# Patient Record
Sex: FEMALE | Race: WHITE | NOT HISPANIC OR LATINO | Employment: FULL TIME | URBAN - METROPOLITAN AREA
[De-identification: names, ages, dates, MRNs, and addresses within clinical notes are randomized per-mention and may not be internally consistent; named-entity substitution may affect disease eponyms.]

---

## 2017-01-12 DIAGNOSIS — Z82.49 FAMILY HISTORY OF ANEURYSM: Primary | ICD-10-CM

## 2017-01-12 DIAGNOSIS — I77.71 CAROTID ARTERY DISSECTION (H): ICD-10-CM

## 2017-01-13 ENCOUNTER — TELEPHONE (OUTPATIENT)
Dept: NEUROLOGY | Facility: CLINIC | Age: 57
End: 2017-01-13

## 2017-01-13 DIAGNOSIS — I77.71 CAROTID ARTERY DISSECTION (H): Primary | ICD-10-CM

## 2017-02-27 ENCOUNTER — TELEPHONE (OUTPATIENT)
Dept: NEUROLOGY | Facility: CLINIC | Age: 57
End: 2017-02-27

## 2017-04-04 ENCOUNTER — TELEPHONE (OUTPATIENT)
Dept: NEUROLOGY | Facility: CLINIC | Age: 57
End: 2017-04-04

## 2017-04-04 NOTE — TELEPHONE ENCOUNTER
Received VM from Martha at [facility] in [place]. She had questions about the orders. Will forward to Kathleen Germain RN in .

## 2017-04-05 NOTE — TELEPHONE ENCOUNTER
I returned call to Martha at [facility] . She was not at clinic. Co worker(Cayden) states that pt had scan yesterday.  Cayden will give message to Martha and they will call back if any question still. I left  phone number  Darla Severin-Brown, ZACKN

## 2017-05-23 ENCOUNTER — OFFICE VISIT (OUTPATIENT)
Dept: NEUROSURGERY | Facility: CLINIC | Age: 57
End: 2017-05-23

## 2017-05-23 VITALS — HEART RATE: 71 BPM | SYSTOLIC BLOOD PRESSURE: 135 MMHG | DIASTOLIC BLOOD PRESSURE: 72 MMHG | HEIGHT: 64 IN

## 2017-05-23 DIAGNOSIS — I77.71 INTERNAL CAROTID ARTERY DISSECTION (H): Primary | ICD-10-CM

## 2017-05-23 ASSESSMENT — ENCOUNTER SYMPTOMS
HALLUCINATIONS: 0
FEVER: 0
POLYPHAGIA: 0
NIGHT SWEATS: 0
POLYDIPSIA: 0
WEIGHT LOSS: 0
CHILLS: 0
WEIGHT GAIN: 0
FATIGUE: 1
ALTERED TEMPERATURE REGULATION: 1
DECREASED APPETITE: 0
INCREASED ENERGY: 1

## 2017-05-23 ASSESSMENT — PAIN SCALES - GENERAL: PAINLEVEL: NO PAIN (0)

## 2017-05-23 NOTE — LETTER
5/23/2017      RE: Kimber Dumont  [address] [facility] [address]       Dear Dr. De Oliveira:    We saw Ms. Kimber Dumont in [facility]surgery [facility] today. She is a 56 year old right-handed woman with family history significant for intracranial aneurysms and essential tremor. An MRA was done at [facility] in [place] to evaluate for aneurysms. No aneurysms were found; however it did reveal a possible carotid dissection. Today, she is doing well.  She was on aspirin and Plavix for 6 months after the diagnosis, and now is on aspirin 81 mg daily now. This dissection was discovered about 2 years ago. She denies any strokes or TIAs. She did have bronchitis 3-4 years ago, but denies vigorous coughing. She sees a chiropractor once a month, but the last time she had a neck manipulation was 2 years ago.   FAMILY HISTORY: Mother had two intracranial aneurysms. Cousin, maternal uncle, and paternal aunt had intracranial aneurysms. No known history of carotid disease.   MEDICAL HISTORY: History of essential tremor. Her voice began changing at 47. She has began noticing mild hand tremors and internal tremors. She has received botox injections for her vocal chords. Taking primidone. Bilateral carpal tunnel and trigger finger release surgeries.   SOCiAL HISTORY: She lives alone in [place], [place] near [place]. 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 She has two children, 24 and 21. Quit smoking 20 years ago.   PHYSICAL EXAM: On exam, she appears well. The most obvious feature is that she has laryngeal tremor and her phonation fluctuates.  Extraocular movements intact. She moves upper and lower extremities equally and with good coordination. Fluent and coherent speech. Pupils are equal and reactive. No ptosis. Gross neurological examination is normal. NIH stroke scale score is 0.  REVIEW OF SYSTEMS: The axial source images show the dissection in the distal cervical segment of the right internal carotid artery and extends to the  craniocervical junction. We can see some mild diffuse dilatation of the ICA on the reconstructions, but here is no obvious pseudoaneurysm. There does not appear to be any flow limitation or attenuation of the artery distal to this segment  IMPRESSION AND PLAN: We agree completely with the medical management of this asymptomatic carotid dissection and continuing aspirin indefinitely. We will arrange for a follow-up MRA in [place] in 1 year and will contact her with the results. It is possible that this was a spontaneous dissection. It is also possible that chiropractor manipulation or severe coughing from bronchitis triggered this, but there is no way to prove these etiologies. She is no longer getting chiropractic manipulation of the neck, and we agree with this strategy.   I appreciate your confidence in involving us in her care. Please do not hesitate to contact us with questions. We will keep you informed of her progress.     I, Holly Kelly, am serving as a scribe to document services personally performed by Lucy Correia MD, based upon my observations and the provider's statements to me. All documentation has been reviewed by the aforementioned doctor prior to being entered into the official medical record.    I, Lucy Correia, attest that above named individual is acting in scribe capacity, has observed my performance of the services and has documented them in accordance with my direction. The documentation recorded by the scribe accurately reflects the service I personally performed and the decisions made by me.      Lucy Correia MD

## 2017-05-23 NOTE — NURSING NOTE
Chief Complaint   Patient presents with     Consult     Carotid dissection/ Family hx of aneurysms/ EPIC/RECORDS REC'D/PACS. -KB    Armaan Brand CMA

## 2017-05-23 NOTE — PROGRESS NOTES
Dear Dr. De Oliveira:    We saw Ms. Kimber Dumont in [facility]bro[facility] Neurosurgery Clinic today. She is a 56 year old right-handed woman with family history significant for intracranial aneurysms and essential tremor. An MRA was done at [facility] in [place] to evaluate for aneurysms. No aneurysms were found; however it did reveal a possible carotid dissection. Today, she is doing well.  She was on aspirin and Plavix for 6 months after the diagnosis, and now is on aspirin 81 mg daily now. This dissection was discovered about 2 years ago. She denies any strokes or TIAs. She did have bronchitis 3-4 years ago, but denies vigorous coughing. She sees a chiropractor once a month, but the last time she had a neck manipulation was 2 years ago.   FAMILY HISTORY: Mother had two intracranial aneurysms. Cousin, maternal uncle, and paternal aunt had intracranial aneurysms. No known history of carotid disease.   MEDICAL HISTORY: History of essential tremor. Her voice began changing at 47. She has began noticing mild hand tremors and internal tremors. She has received botox injections for her vocal chords. Taking primidone. Bilateral carpal tunnel and trigger finger release surgeries.   SOCiAL HISTORY: She lives alone in [place], [place] near [place]. 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 She has two children, 24 and 21. Quit smoking 20 years ago.   PHYSICAL EXAM: On exam, she appears well. The most obvious feature is that she has laryngeal tremor and her phonation fluctuates.  Extraocular movements intact. She moves upper and lower extremities equally and with good coordination. Fluent and coherent speech. Pupils are equal and reactive. No ptosis. Gross neurological examination is normal. NIH stroke scale score is 0.  REVIEW OF SYSTEMS: The axial source images show the dissection in the distal cervical segment of the right internal carotid artery and extends to the craniocervical junction. We can see some mild diffuse dilatation of the ICA on  the reconstructions, but here is no obvious pseudoaneurysm. There does not appear to be any flow limitation or attenuation of the artery distal to this segment  IMPRESSION AND PLAN: We agree completely with the medical management of this asymptomatic carotid dissection and continuing aspirin indefinitely. We will arrange for a follow-up MRA in [place] in 1 year and will contact her with the results. It is possible that this was a spontaneous dissection. It is also possible that chiropractor manipulation or severe coughing from bronchitis triggered this, but there is no way to prove these etiologies. She is no longer getting chiropractic manipulation of the neck, and we agree with this strategy.   I appreciate your confidence in involving us in her care. Please do not hesitate to contact us with questions. We will keep you informed of her progress.     ROBERT ANDREWS MD    I, Holly Kelly, am serving as a scribe to document services personally performed by Robert Andrews MD, based upon my observations and the provider's statements to me. All documentation has been reviewed by the aforementioned doctor prior to being entered into the official medical record.    I, Robert Andrews, attest that above named individual is acting in scribe capacity, has observed my performance of the services and has documented them in accordance with my direction. The documentation recorded by the scribe accurately reflects the service I personally performed and the decisions made by me.

## 2017-05-23 NOTE — LETTER
5/23/2017       RE: Kimber Dumont  [address] [facility] [address]     Dear Colleague,    Thank you for referring your patient, Kimber Dumont, to the [facility] NEUROSURGERY at [facility]. Please see a copy of my visit note below.    Dear Dr. De Oliveira:    We saw Ms. Kimber Dumont in [facility]brovascular Neurosurgery Clinic today. She is a 56 year old right-handed woman with family history significant for intracranial aneurysms and essential tremor. An MRA was done at [facility] in [place] to evaluate for aneurysms. No aneurysms were found; however it did reveal a possible carotid dissection. Today, she is doing well.  She was on aspirin and Plavix for 6 months after the diagnosis, and now is on aspirin 81 mg daily now. This dissection was discovered about 2 years ago. She denies any strokes or TIAs. She did have bronchitis 3-4 years ago, but denies vigorous coughing. She sees a chiropractor once a month, but the last time she had a neck manipulation was 2 years ago.   FAMILY HISTORY: Mother had two intracranial aneurysms. Cousin, maternal uncle, and paternal aunt had intracranial aneurysms. No known history of carotid disease.   MEDICAL HISTORY: History of essential tremor. Her voice began changing at 47. She has began noticing mild hand tremors and internal tremors. She has received botox injections for her vocal chords. Taking primidone. Bilateral carpal tunnel and trigger finger release surgeries.   SOCiAL HISTORY: She lives alone in [place], [place] near [place]. 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 She has two children, 24 and 21. Quit smoking 20 years ago.   PHYSICAL EXAM: On exam, she appears well. The most obvious feature is that she has laryngeal tremor and her phonation fluctuates.  Extraocular movements intact. She moves upper and lower extremities equally and with good coordination. Fluent and coherent speech. Pupils are equal and reactive. No ptosis. Gross neurological  examination is normal. NIH stroke scale score is 0.  REVIEW OF SYSTEMS: The axial source images show the dissection in the distal cervical segment of the right internal carotid artery and extends to the craniocervical junction. We can see some mild diffuse dilatation of the ICA on the reconstructions, but here is no obvious pseudoaneurysm. There does not appear to be any flow limitation or attenuation of the artery distal to this segment  IMPRESSION AND PLAN: We agree completely with the medical management of this asymptomatic carotid dissection and continuing aspirin indefinitely. We will arrange for a follow-up MRA in [place] in 1 year and will contact her with the results. It is possible that this was a spontaneous dissection. It is also possible that chiropractor manipulation or severe coughing from bronchitis triggered this, but there is no way to prove these etiologies. She is no longer getting chiropractic manipulation of the neck, and we agree with this strategy.   I appreciate your confidence in involving us in her care. Please do not hesitate to contact us with questions. We will keep you informed of her progress.     ROBERT ANDREWS MD    I, Holly Kelly, am serving as a scribe to document services personally performed by Robert Andrews MD, based upon my observations and the provider's statements to me. All documentation has been reviewed by the aforementioned doctor prior to being entered into the official medical record.    I, Robert Andrews, attest that above named individual is acting in scribe capacity, has observed my performance of the services and has documented them in accordance with my direction. The documentation recorded by the scribe accurately reflects the service I personally performed and the decisions made by me.

## 2017-05-23 NOTE — MR AVS SNAPSHOT
"              After Visit Summary   5/23/2017    Kimber Dumont    MRN: [id number]           Patient Information     Date Of Birth          1960        Visit Information        Provider Department      5/23/2017 3:30 PM Lucy Correia MD [facility] Neurosurgery        Today's Diagnoses     Internal carotid artery dissection (H)    -  1       Follow-ups after your visit        Who to contact     Please call your clinic at [phone] to:    Ask questions about your health    Make or cancel appointments    Discuss your medicines    Learn about your test results    Speak to your doctor   If you have compliments or concerns about an experience at your clinic, or if you wish to file a complaint, please contact [facility] Patient Relations at [phone] or email us at [email or web address]         Additional Information About Your Visit        MyChart Information     [employer] gives you secure access to your electronic health record. If you see a primary care provider, you can also send messages to your care team and make appointments. If you have questions, please call your primary care clinic.  If you do not have a primary care provider, please call [phone] and they will assist you.      [employer] is an electronic gateway that provides easy, online access to your medical records. With [employer], you can request a clinic appointment, read your test results, renew a prescription or communicate with your care team.     To access your existing account, please contact your [facility] Clinic or call [phone] for assistance.        Care EveryWhere ID     This is your Care EveryWhere ID. This could be used by other organizations to access your [facility] medical records  [id number]        Your Vitals Were     Pulse Height                71 1.626 m (5' 4\")           Blood Pressure from Last 3 Encounters:   05/23/17 135/72   12/19/16 140/80    " Weight from Last 3 Encounters:   12/19/16 91.2 kg (201 lb)              Today, you had the following     No orders found for display       Primary Care Provider Office Phone # Fax #    Latha Parra [phone]       [facility] [address] [facility] [address]        Thank you!     Thank you for choosing [facility]  for your care. Our goal is always to provide you with excellent care. Hearing back from our patients is one way we can continue to improve our services. Please take a few minutes to complete the written survey that you may receive in the mail after your visit with us. Thank you!             Your Updated Medication List - Protect others around you: Learn how to safely use, store and throw away your medicines at www.disposemymeds.org.          This list is accurate as of: 5/23/17 11:59 PM.  Always use your most recent med list.                   Brand Name Dispense Instructions for use    aspirin 81 MG tablet      Take 81 mg by mouth daily       primidone 12.5 mg quarter-tab    MYSOLINE     Take 12.5 mg by mouth At Bedtime       propranolol 20 MG tablet    INDERAL     Take 20 mg by mouth as needed

## 2017-12-03 ENCOUNTER — HEALTH MAINTENANCE LETTER (OUTPATIENT)
Age: 57
End: 2017-12-03

## 2018-05-03 ENCOUNTER — TELEPHONE (OUTPATIENT)
Dept: NEUROLOGY | Facility: CLINIC | Age: 58
End: 2018-05-03

## 2018-05-03 DIAGNOSIS — I77.71 CAROTID ARTERY DISSECTION (H): Primary | ICD-10-CM

## 2018-05-03 NOTE — TELEPHONE ENCOUNTER
Patient due for MRA head/neck to follow-up on right carotid dissection. Patient may do this locally and follow-up via phone.     Spoke with patient. She states she is doing well. No concerns at this time. She will go to [facility] in [place], [place]. Ph# [phone], Fax#[phone]. Orders faxed. Advised patient to call after imaging completed. Will contact her with results and plan following Dr. Correia's review (msg sent). Patient verbalized understanding.

## 2018-05-22 ENCOUNTER — TRANSFERRED RECORDS (OUTPATIENT)
Dept: HEALTH INFORMATION MANAGEMENT | Facility: CLINIC | Age: 58
End: 2018-05-22

## 2018-06-25 ENCOUNTER — CARE COORDINATION (OUTPATIENT)
Dept: NEUROLOGY | Facility: CLINIC | Age: 58
End: 2018-06-25

## 2018-06-25 NOTE — PROGRESS NOTES
Per Dr. Correia - Review of MRA Head/Neck obtained at [facility] on 5/22/18 shows R ICA still showing a slight irregularity. Continue aspirin. Repeat MRA in 5 years due to family history of aneurysms.     Informed patient of above. She is in agreement with the plan. We will call her closer to the 5 year leroy to arrange imaging. Patient aware. Patient has my contact information and was encouraged to call with questions/concerns in the interim.

## 2020-03-02 ENCOUNTER — HEALTH MAINTENANCE LETTER (OUTPATIENT)
Age: 60
End: 2020-03-02

## 2020-12-20 ENCOUNTER — HEALTH MAINTENANCE LETTER (OUTPATIENT)
Age: 60
End: 2020-12-20

## 2021-04-18 ENCOUNTER — HEALTH MAINTENANCE LETTER (OUTPATIENT)
Age: 61
End: 2021-04-18

## 2021-09-03 NOTE — TELEPHONE ENCOUNTER
FUTURE VISIT INFORMATION      FUTURE VISIT INFORMATION:    Date: 10/5/2021    Time: 2pm    Location: [facility]  REFERRAL INFORMATION:    Referring provider:  Self     Referring providers clinic:      Reason for visit/diagnosis  Follow-up     RECORDS REQUESTED FROM:       Clinic name Comments Records Status Imaging Status   Internal Dr. Correia-5/23/2017 Epic N/A          [facility]  MRA Head/Neck-5/22/2018 Care Everywhere Requested to PACS                       9/3/202-Spoke with [facility] to have images pushed ASAP-MR @ 1202pm

## 2021-09-17 ENCOUNTER — TELEPHONE (OUTPATIENT)
Dept: NEUROSURGERY | Facility: CLINIC | Age: 61
End: 2021-09-17

## 2021-10-03 ENCOUNTER — HEALTH MAINTENANCE LETTER (OUTPATIENT)
Age: 61
End: 2021-10-03

## 2021-10-05 ENCOUNTER — PRE VISIT (OUTPATIENT)
Dept: NEUROSURGERY | Facility: CLINIC | Age: 61
End: 2021-10-05

## 2022-03-20 ENCOUNTER — HEALTH MAINTENANCE LETTER (OUTPATIENT)
Age: 62
End: 2022-03-20

## 2022-05-15 ENCOUNTER — HEALTH MAINTENANCE LETTER (OUTPATIENT)
Age: 62
End: 2022-05-15

## 2022-08-08 ENCOUNTER — MEDICAL CORRESPONDENCE (OUTPATIENT)
Dept: HEALTH INFORMATION MANAGEMENT | Facility: CLINIC | Age: 62
End: 2022-08-08

## 2022-08-10 ENCOUNTER — TRANSCRIBE ORDERS (OUTPATIENT)
Dept: OTHER | Age: 62
End: 2022-08-10

## 2022-08-10 DIAGNOSIS — I77.71 CAROTID ARTERY DISSECTION (H): Primary | ICD-10-CM

## 2022-09-04 ENCOUNTER — HEALTH MAINTENANCE LETTER (OUTPATIENT)
Age: 62
End: 2022-09-04

## 2023-05-19 ENCOUNTER — TELEPHONE (OUTPATIENT)
Dept: NEUROLOGY | Facility: CLINIC | Age: 63
End: 2023-05-19
Payer: COMMERCIAL

## 2023-05-19 DIAGNOSIS — I77.71 CAROTID ARTERY DISSECTION (H): Primary | ICD-10-CM

## 2023-05-19 DIAGNOSIS — Z82.49 FAMILY HISTORY OF ANEURYSM: ICD-10-CM

## 2023-05-19 NOTE — TELEPHONE ENCOUNTER
Patient due for MRA 5/2023 -     Per Dr. Correia - Review of MRA Head/Neck obtained at [facility] on 5/22/18 shows R ICA still showing a slight irregularity. Continue aspirin. Repeat MRA in 5 years due to family history of aneurysms.     Spoke with patient. She would like to go to [facility] again. She would like to schedule a phone visit with Dr. Correia to review scan.     Will have Navigator Team fax order and have scheduling reach out.    Advised patient to call to schedule MRI early next week.     Patient verbalized understanding and in agreement. Contact information provided and encouraged to call with questions/concerns in the interim. Alicia Delgado RN 5/19/2023 3:45 PM

## 2023-05-22 NOTE — TELEPHONE ENCOUNTER
Faxed  MRA head/neck orders to [facility] to fax #[phone].per Alicia JUARES RN

## 2023-05-23 ENCOUNTER — TELEPHONE (OUTPATIENT)
Dept: NEUROSURGERY | Facility: CLINIC | Age: 63
End: 2023-05-23
Payer: COMMERCIAL

## 2023-06-03 ENCOUNTER — HEALTH MAINTENANCE LETTER (OUTPATIENT)
Age: 63
End: 2023-06-03

## 2023-07-03 ENCOUNTER — TELEPHONE (OUTPATIENT)
Dept: NEUROSURGERY | Facility: CLINIC | Age: 63
End: 2023-07-03
Payer: COMMERCIAL

## 2023-07-11 ENCOUNTER — VIRTUAL VISIT (OUTPATIENT)
Dept: NEUROSURGERY | Facility: CLINIC | Age: 63
End: 2023-07-11
Payer: COMMERCIAL

## 2023-07-11 DIAGNOSIS — I77.71 DISSECTION OF RIGHT CAROTID ARTERY (H): Primary | ICD-10-CM

## 2023-07-11 PROCEDURE — 99202 OFFICE O/P NEW SF 15 MIN: CPT | Mod: 93 | Performed by: NEUROLOGICAL SURGERY

## 2023-07-11 NOTE — NURSING NOTE
Is the patient currently in the state of [place]? YES    Visit mode:TELEPHONE    If the visit is dropped, the patient can be reconnected by: TELEPHONE VISIT: Phone number: [phone]    Will anyone else be joining the visit? NO      How would you like to obtain your AVS? MyChart    Are changes needed to the allergy or medication list? NO    Reason for visit: RECHECK

## 2023-07-11 NOTE — LETTER
7/11/2023       RE: Kimber Dumont  [address] [facility] [address]     Dear Colleague,    Thank you for referring your patient, Kimber Dumont, to the [facility] [place] at [facility]. Please see a copy of my visit note below.    Virtual Visit Details    Type of service:  Telephone Visit   Phone call duration: 15 minutes     Voice has tremor, Some headaches over the past year, suboccipital region (out of character)  Intermittent, mostly tolerable, but nagging. No radiation into the arms. Radiates towards the forehead.    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   Still sees chiropractor  On baby aspirin    Repeat MRA brain without contrast in 2-3 years at [place] ([facility] facility)        Again, thank you for allowing me to participate in the care of your patient.      Sincerely,    Lucy Correia MD

## 2023-07-11 NOTE — PROGRESS NOTES
Virtual Visit Details    Type of service:  Telephone Visit   Phone call duration: 15 minutes     Voice has tremor, Some headaches over the past year, suboccipital region (out of character)  Intermittent, mostly tolerable, but nagging. No radiation into the arms. Radiates towards the forehead.    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   Still sees chiropractor  On baby aspirin    Repeat MRA brain without contrast in 2-3 years at [place] ([facility] facility)

## 2023-07-14 NOTE — PATIENT INSTRUCTIONS
Follow-up with Dr. Correia with a repeat MRA brain without contrast in 2-3 years at [place] ([facility] facility)    Stroke & Endovascular RN Care Coordinators:    Ashlee Rivas, RN, BSN  Alicia Delgado, RN, CNRN, SCRN    If you have any questions please contact the RN Care Coordinators at [phone], option 1.     After business hours call the  at [phone] and have the Neuro-Interventional Fellow paged.    Thank you for choosing [facility] for your health care needs.

## 2024-04-28 ENCOUNTER — HEALTH MAINTENANCE LETTER (OUTPATIENT)
Age: 64
End: 2024-04-28

## 2024-07-07 ENCOUNTER — HEALTH MAINTENANCE LETTER (OUTPATIENT)
Age: 64
End: 2024-07-07

## 2024-08-08 DIAGNOSIS — I77.71 CAROTID ARTERY DISSECTION (H): Primary | ICD-10-CM

## 2024-08-08 NOTE — PROGRESS NOTES
MRA entered per note from Dr. Correia on 7/11/23,   Repeat MRA brain without contrast in 2-3 years at [place] ([facility] facility)   Ashlee Lilly RN 8/8/2024 8:04 AM

## 2024-09-11 ENCOUNTER — TELEPHONE (OUTPATIENT)
Dept: NEUROSURGERY | Facility: CLINIC | Age: 64
End: 2024-09-11
Payer: COMMERCIAL

## 2024-09-11 NOTE — TELEPHONE ENCOUNTER
Left Voicemail (1st Attempt) for the patient to call back and schedule the following:    Location: [facility]  Provider: Bren  Appointment type: Return Vascular  Appointment mode In person   Return date: 7-    Specialty phone number: [phone]    Is Imaging Needed: Y  Imaging Phone Number to provide to patient: [phone]    Additional Notes: Patient will need MRA prior- patient lives out of [facility] and may want to do the imaging closer to home prior to appointment.

## 2024-10-24 ENCOUNTER — TELEPHONE (OUTPATIENT)
Dept: NEUROSURGERY | Facility: CLINIC | Age: 64
End: 2024-10-24
Payer: COMMERCIAL

## 2024-10-24 ENCOUNTER — DOCUMENTATION ONLY (OUTPATIENT)
Dept: NEUROSURGERY | Facility: CLINIC | Age: 64
End: 2024-10-24
Payer: COMMERCIAL

## 2024-10-24 NOTE — TELEPHONE ENCOUNTER
Copied from 9/11/24 telephone encounter:      Jennifer Maria LPN routed conversation to Stroke/Neurovascular [id number] hours ago (5:54 PM)     Demi Sanchez routed conversation to p-Neurosurg-Adult-Csc15 hours ago (2:27 PM)     Burt Sanchez5 hours ago (2:27 PM)     LW  Pt wants to have MRA done locally at [facility] in [place].  Please call Pt back to confirm once the order is sent.  Thanks.

## 2024-10-24 NOTE — PROGRESS NOTES
MRA Brain order faxed to [facility] [place] at fax# [phone] per patient request.       Called patient LVM stating writer faxed imaging order per her request. Left our clinic number to call us back with any questions or concerns.

## 2024-10-24 NOTE — TELEPHONE ENCOUNTER
Per 7/11/23 visit with Dr. Correia: Repeat MRA brain without contrast in 2-3 years at [place] ([facility] facility)     Patient is not due for imaging until 7/2025-7/2026. Follow-up appointment with Dr. Correia scheduled for 7/8/25.     Routed to Navigator Team to fax order for July 2025.    Alicia Delgado RN 10/24/2024 6:25 AM

## 2025-05-16 ENCOUNTER — TRANSFERRED RECORDS (OUTPATIENT)
Dept: HEALTH INFORMATION MANAGEMENT | Facility: CLINIC | Age: 65
End: 2025-05-16

## 2025-07-08 ENCOUNTER — VIRTUAL VISIT (OUTPATIENT)
Dept: NEUROSURGERY | Facility: CLINIC | Age: 65
End: 2025-07-08
Payer: COMMERCIAL

## 2025-07-08 DIAGNOSIS — I72.0 CAROTID PSEUDOANEURYSM: Primary | ICD-10-CM

## 2025-07-08 NOTE — PROGRESS NOTES
Virtual Visit Details  Date of visit: 7/8/2025  Type of service:  Video Visit   Length of service: 20 minutes  Originating Location (pt. Location): Home    Distant Location (provider location):  On-site  Platform used for Video Visit: Kitty Watts PA-C   [facility]   [address] [place] [address]    Dear Ms. Painting:    We spoke to Ms. Dumont as part of a virtual video follow-up in cerebrovascular clinic today.  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  She remains on a daily aspirin.  She has not had any symptoms suggestive of TIA or stroke.  In general, she is been feeling well over the past 2 years since we last spoke to her.  She has ongoing left knee pain.  She remains on primidone for her vocal essential tremor.    Over the video platform, her general appearance is good.  There are fluctuations in her phonation due to the essential tremor.  Facial strength is normal.  There is no ptosis.  Speech and language are normal.    The MRA of the head from 5/16/2025 in [place] was not immediately available to us.    Given her clinical stability, we doubt that any intervention will be necessary for the pseudoaneurysm.  We will contact her as soon as we obtain the images.  She should remain on daily aspirin 81 mg.  If the imaging is stable, we we will repeat an MRA in about 2 years.  This can be done in [place] for her convenience.    We will keep you informed of her progress.    Sincerely,        Lucy Correia MD  Department of Neurosurgery    Generally good past 2 years  Left Knee pain, arthroscopic surgery,   On primodone for vocal tremor    MRA head done at [facility] in [place]    Primary - [facility] at [facility],    Images not available. Will get images pushed and contact pt within a day

## 2025-07-08 NOTE — LETTER
7/8/2025       RE: Kimber Dumont  [address] [facility] [address]     Dear Colleague,    Thank you for referring your patient, Kimber Dumont, to the [facility] NEUROSURGERY CLINIC [place] at [facility]. Please see a copy of my visit note below.    Virtual Visit Details  Date of visit: 7/8/2025  Type of service:  Video Visit   Length of service: 20 minutes  Originating Location (pt. Location): Home    Distant Location (provider location):  On-site  Platform used for Video Visit: Kitty Watts PA-C   [facility]   [address] [place] [address]    Dear Ms. Painting:    We spoke to Ms. Dumont as part of a virtual video follow-up in cerebrovascular clinic today.  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  She remains on a daily aspirin.  She has not had any symptoms suggestive of TIA or stroke.  In general, she is been feeling well over the past 2 years since we last spoke to her.  She has ongoing left knee pain.  She remains on primidone for her vocal essential tremor.    Over the video platform, her general appearance is good.  There are fluctuations in her phonation due to the essential tremor.  Facial strength is normal.  There is no ptosis.  Speech and language are normal.    The MRA of the head from 5/16/2025 in [place] was not immediately available to us.    Given her clinical stability, we doubt that any intervention will be necessary for the pseudoaneurysm.  We will contact her as soon as we obtain the images.  She should remain on daily aspirin 81 mg.  If the imaging is stable, we we will repeat an MRA in about 2 years.  This can be done in [place] for her convenience.    We will keep you informed of her progress.    Sincerely,        Lucy Correia MD  Department of Neurosurgery    Generally good past 2 years  Left Knee  pain, arthroscopic surgery,   On primodone for vocal tremor    MRA head done at [facility] in [place]    Primary - [facility] at [facility],    Images not available. Will get images pushed and contact pt within a day          Again, thank you for allowing me to participate in the care of your patient.      Sincerely,    Lucy Correia MD

## 2025-07-08 NOTE — NURSING NOTE
Current patient location: [address] [place] [id number]    Is the patient currently in the state of [place]? YES    Visit mode: VIDEO    If the visit is dropped, the patient can be reconnected by:TELEPHONE VISIT: Phone number:   Telephone Information:   Mobile [phone]       Will anyone else be joining the visit? NO  (If patient encounters technical issues they should call [phone]    Are changes needed to the allergy or medication list? Pt stated no med changes    Are refills needed on medications prescribed by this physician? NO    Rooming Documentation:  Questionnaire(s) completed    Reason for visit: ROWENA SCHMIDT

## 2025-07-13 ENCOUNTER — HEALTH MAINTENANCE LETTER (OUTPATIENT)
Age: 65
End: 2025-07-13

## 2025-07-15 ENCOUNTER — DOCUMENTATION ONLY (OUTPATIENT)
Dept: NEUROSURGERY | Facility: CLINIC | Age: 65
End: 2025-07-15
Payer: COMMERCIAL

## 2025-07-15 NOTE — PROGRESS NOTES
Neuroscience Clinic Task Note    TASK    Return Records - Neurosurgery  Records received from: [facility]   Reason for visit: Return   Provider: Dr. Correia    Date of appt:  N/A    NOTES  (For all visits) STATUS DETAILS   OFFICE NOTE   from referring provider Care Everywhere    OFFICE NOTE   from other specialist Received 05/16/2025 MRA Brain [facility]/ report faxed to HIM, emailed to RNCC>    DISCHARGE SUMMARY   from hospital Care Everywhere    DISCHARGE REPORT   from ER Care Everywhere    Operative Report Care Everywhere    EMG Report Care Everywhere    Medication List Care Everywhere    IMAGING  (For all visits)     MRI (head, neck, spine) Received 05/16/2025 MRA Brain [facility] in [place]    XRAY (spine)   *NEUROSURGERY* Care Everywhere    CT (head, neck, spine) Care Everywhere        FOLLOW-UP       ADDITIONAL COMMENTS       Jennifer Maria LPN

## 2025-07-23 ENCOUNTER — MYC MEDICAL ADVICE (OUTPATIENT)
Dept: NEUROSURGERY | Facility: CLINIC | Age: 65
End: 2025-07-23
Payer: COMMERCIAL

## 2025-07-23 DIAGNOSIS — I72.0 CAROTID PSEUDOANEURYSM: Primary | ICD-10-CM

## 2025-07-24 ENCOUNTER — DOCUMENTATION ONLY (OUTPATIENT)
Dept: NEUROSURGERY | Facility: CLINIC | Age: 65
End: 2025-07-24
Payer: COMMERCIAL

## 2025-07-24 NOTE — PROGRESS NOTES
Neuroscience Clinic Task Note    TASK Report requested for patient   Writer spoke with [facility] for patient MRA report to be emailed to the patient. Provided patient email address, RNCC notified report to be emailed to patient from imaging facility.     Return Records - Neurosurgery  Records received from: [facility]    Reason for visit:  Return    Provider:  Dr.M Correia    Date of appt:  Follow phone call    NOTES  (For all visits) STATUS DETAILS   OFFICE NOTE   from referring provider N/A    OFFICE NOTE   from other specialist Care Everywhere    DISCHARGE SUMMARY   from hospital N/A    DISCHARGE REPORT   from ER N/A    Operative Report N/A    EMG Report N/A    Medication List N/A    IMAGING  (For all visits)     MRI (head, neck, spine) Care Everywhere    XRAY (spine)   *NEUROSURGERY* N/A    CT (head, neck, spine) N/A        FOLLOW-UP      ADDITIONAL COMMENTS       Jennifer Maria LPN

## 2025-07-28 ENCOUNTER — TELEPHONE (OUTPATIENT)
Dept: NEUROSURGERY | Facility: CLINIC | Age: 65
End: 2025-07-28
Payer: COMMERCIAL